# Patient Record
Sex: FEMALE | Race: WHITE | Employment: OTHER | URBAN - METROPOLITAN AREA
[De-identification: names, ages, dates, MRNs, and addresses within clinical notes are randomized per-mention and may not be internally consistent; named-entity substitution may affect disease eponyms.]

---

## 2018-03-21 PROBLEM — 14304000 THYROID DISORDER: Status: ACTIVE | Noted: 2018-03-21

## 2018-03-21 PROBLEM — 38341003 HYPERTENSION: Status: ACTIVE | Noted: 2018-03-21

## 2018-03-21 PROBLEM — 255417007 CIRRHOTIC: Status: ACTIVE | Noted: 2018-03-21

## 2019-08-22 PROBLEM — 170747006: Status: ACTIVE | Noted: 2019-08-22

## 2020-01-07 ENCOUNTER — HOSPITAL ENCOUNTER (EMERGENCY)
Age: 60
Discharge: STILL ADMITTED OR ADMINISTRATIVE | End: 2020-01-07
Attending: EMERGENCY MEDICINE

## 2020-01-07 ENCOUNTER — HOSPITAL ENCOUNTER (OUTPATIENT)
Dept: GENERAL RADIOLOGY | Age: 60
Discharge: HOME | End: 2020-01-07
Payer: COMMERCIAL

## 2020-01-07 DIAGNOSIS — M79.672 LEFT FOOT PAIN: ICD-10-CM

## 2020-01-07 DIAGNOSIS — M79.89 SWELLING OF LEFT FOOT: ICD-10-CM

## 2020-01-07 PROCEDURE — 73630 X-RAY EXAM OF FOOT: CPT

## 2020-09-02 ENCOUNTER — OFFICE VISIT (OUTPATIENT)
Dept: URBAN - METROPOLITAN AREA CLINIC 46 | Facility: CLINIC | Age: 60
End: 2020-09-02

## 2021-08-28 ENCOUNTER — TELEPHONE ENCOUNTER (OUTPATIENT)
Dept: URBAN - METROPOLITAN AREA CLINIC 13 | Facility: CLINIC | Age: 61
End: 2021-08-28

## 2021-08-28 RX ORDER — AZATHIOPRINE 50 MG/1
TABLET ORAL
OUTPATIENT
End: 2020-09-02

## 2021-08-29 ENCOUNTER — TELEPHONE ENCOUNTER (OUTPATIENT)
Dept: URBAN - METROPOLITAN AREA CLINIC 13 | Facility: CLINIC | Age: 61
End: 2021-08-29

## 2021-08-29 RX ORDER — PREDNISONE 5 MG/1
TABLET ORAL
Status: ACTIVE | COMMUNITY

## 2021-08-29 RX ORDER — TRIAMTERENE AND HYDROCHLOROTHIAZIDE 37.5; 25 MG/1; MG/1
CAPSULE ORAL
Status: ACTIVE | COMMUNITY

## 2021-08-29 RX ORDER — LEVOTHYROXINE SODIUM 88 UG/1
TABLET ORAL
Status: ACTIVE | COMMUNITY

## 2021-08-29 RX ORDER — AZATHIOPRINE 50 MG/1
TABLET ORAL
Status: ACTIVE | COMMUNITY

## 2021-08-29 RX ORDER — POTASSIUM CHLORIDE 1500 MG/1
TABLET, EXTENDED RELEASE ORAL
Status: ACTIVE | COMMUNITY

## 2021-09-13 ENCOUNTER — LAB OUTSIDE AN ENCOUNTER (OUTPATIENT)
Dept: URBAN - METROPOLITAN AREA CLINIC 44 | Facility: CLINIC | Age: 61
End: 2021-09-13

## 2021-09-13 ENCOUNTER — OFFICE VISIT (OUTPATIENT)
Dept: URBAN - METROPOLITAN AREA CLINIC 44 | Facility: CLINIC | Age: 61
End: 2021-09-13
Payer: COMMERCIAL

## 2021-09-13 VITALS
HEART RATE: 67 BPM | HEIGHT: 65 IN | TEMPERATURE: 98 F | BODY MASS INDEX: 27.79 KG/M2 | DIASTOLIC BLOOD PRESSURE: 71 MMHG | WEIGHT: 166.8 LBS | SYSTOLIC BLOOD PRESSURE: 123 MMHG

## 2021-09-13 DIAGNOSIS — K75.4 AUTOIMMUNE HEPATITIS TREATED WITH STEROIDS: ICD-10-CM

## 2021-09-13 DIAGNOSIS — K74.60 CIRRHOSIS OF LIVER WITHOUT ASCITES, UNSPECIFIED HEPATIC CIRRHOSIS TYPE: ICD-10-CM

## 2021-09-13 PROCEDURE — 99214 OFFICE O/P EST MOD 30 MIN: CPT | Performed by: INTERNAL MEDICINE

## 2021-09-13 RX ORDER — LOSARTAN POTASSIUM 25 MG/1
1 TABLET TABLET ORAL ONCE A DAY
Status: ACTIVE | COMMUNITY

## 2021-09-13 RX ORDER — ALENDRONATE SODIUM 70 MG/1
1 TABLET 30 MINUTES BEFORE THE FIRST FOOD, BEVERAGE OR MEDICINE OF THE DAY WITH PLAIN WATER TABLET ORAL
Status: ACTIVE | COMMUNITY

## 2021-09-13 RX ORDER — LEVOTHYROXINE SODIUM 88 UG/1
TABLET ORAL
Status: ACTIVE | COMMUNITY

## 2021-09-13 RX ORDER — POTASSIUM CHLORIDE 1500 MG/1
2 TABLET WITH FOOD TABLET, EXTENDED RELEASE ORAL
Status: ACTIVE | COMMUNITY

## 2021-09-13 RX ORDER — INSULIN ASPART 100 [IU]/ML
AS DIRECTED INJECTION, SUSPENSION SUBCUTANEOUS
Status: ACTIVE | COMMUNITY

## 2021-09-13 RX ORDER — TRIAMTERENE AND HYDROCHLOROTHIAZIDE 37.5; 25 MG/1; MG/1
1 TABLET IN THE MORNING TABLET ORAL
Status: ACTIVE | COMMUNITY

## 2021-09-13 RX ORDER — PREDNISONE 5 MG/1
3 TABLETS TABLET ORAL ONCE A DAY
Status: ACTIVE | COMMUNITY

## 2021-09-13 RX ORDER — AZATHIOPRINE 50 MG/1
1 1/2 TABLETS TABLET ORAL DAILY
Status: ACTIVE | COMMUNITY

## 2021-09-13 NOTE — PHYSICAL EXAM NECK/THYROID:
normal appearance , without tenderness upon palpation , no deformities , trachea midline , Thyroid normal size , no thyroid nodules , no masses , no JVD , thyroid nontender Clindamycin Counseling: I counseled the patient regarding use of clindamycin as an antibiotic for prophylactic and/or therapeutic purposes. Clindamycin is active against numerous classes of bacteria, including skin bacteria. Side effects may include nausea, diarrhea, gastrointestinal upset, rash, hives, yeast infections, and in rare cases, colitis.

## 2021-09-13 NOTE — PHYSICAL EXAM HENT:
Head, normocephalic, atraumatic, Face, Face within normal limits, Ears, External ears within normal limits, Nose/Nasopharynx, External nose  normal appearance, nares patent, no nasal discharge, Mouth and Throat, Oral cavity appearance normal, Breath odor normal, Lips, Appearance normal
0 = independent

## 2021-09-13 NOTE — HPI-TODAY'S VISIT:
Toya Anderson is a 60 year old, female, presents for evaluation of AIH. Dx with AIH in 1985 and whose last  MELD of 10[which it has been since 2015] [AST 21, ALT 19, Alk geraldine 76, Alb 3.2, bili 1.2] She has been on AZA and varying low doses of prednisone since her DX. She is on Prednisone 15 mg now for last few years and multiple attempts to lower the dose has met with increase in transaminases. The patient remains asymptomatic and denies flares. Mentions that if medications are decreased she will have a flare. Follows w/ Dr. Martinez(hepatology) every 3 months.  Today, she denies any symptoms of jaundice, fatigue or abdominal pain.

## 2021-09-29 ENCOUNTER — ERX REFILL RESPONSE (OUTPATIENT)
Dept: URBAN - METROPOLITAN AREA CLINIC 44 | Facility: CLINIC | Age: 61
End: 2021-09-29

## 2021-09-29 RX ORDER — AZATHIOPRINE 50 MG/1
1 1/2 TABLETS TABLET ORAL DAILY
OUTPATIENT

## 2021-09-29 RX ORDER — AZATHIOPRINE 50 MG/1
TAKE 1 AND 1/2 TABLET BY MOUTH DAILY TABLET ORAL
Qty: 135 TABLET | Refills: 1 | OUTPATIENT

## 2021-10-14 ENCOUNTER — TELEPHONE ENCOUNTER (OUTPATIENT)
Dept: URBAN - METROPOLITAN AREA CLINIC 46 | Facility: CLINIC | Age: 61
End: 2021-10-14

## 2021-10-21 PROBLEM — 19943007: Status: ACTIVE | Noted: 2021-09-13

## 2021-10-26 ENCOUNTER — WEB ENCOUNTER (OUTPATIENT)
Dept: URBAN - METROPOLITAN AREA CLINIC 44 | Facility: CLINIC | Age: 61
End: 2021-10-26

## 2021-10-27 ENCOUNTER — WEB ENCOUNTER (OUTPATIENT)
Dept: URBAN - METROPOLITAN AREA CLINIC 44 | Facility: CLINIC | Age: 61
End: 2021-10-27

## 2021-11-13 ENCOUNTER — LAB OUTSIDE AN ENCOUNTER (OUTPATIENT)
Dept: URBAN - METROPOLITAN AREA CLINIC 44 | Facility: CLINIC | Age: 61
End: 2021-11-13

## 2021-11-16 ENCOUNTER — TELEPHONE ENCOUNTER (OUTPATIENT)
Dept: URBAN - METROPOLITAN AREA CLINIC 46 | Facility: CLINIC | Age: 61
End: 2021-11-16

## 2021-11-22 ENCOUNTER — OFFICE VISIT (OUTPATIENT)
Dept: URBAN - METROPOLITAN AREA TELEHEALTH 2 | Facility: TELEHEALTH | Age: 61
End: 2021-11-22
Payer: COMMERCIAL

## 2021-11-22 VITALS — BODY MASS INDEX: 27.49 KG/M2 | HEIGHT: 65 IN | WEIGHT: 165 LBS

## 2021-11-22 DIAGNOSIS — K75.4 AUTOIMMUNE HEPATITIS TREATED WITH STEROIDS: ICD-10-CM

## 2021-11-22 PROCEDURE — 99212 OFFICE O/P EST SF 10 MIN: CPT | Performed by: INTERNAL MEDICINE

## 2021-11-22 RX ORDER — PREDNISONE 5 MG/1
3 TABLETS TABLET ORAL ONCE A DAY
Status: ACTIVE | COMMUNITY

## 2021-11-22 RX ORDER — TRIAMTERENE AND HYDROCHLOROTHIAZIDE 37.5; 25 MG/1; MG/1
1 TABLET IN THE MORNING TABLET ORAL
Status: ACTIVE | COMMUNITY

## 2021-11-22 RX ORDER — LOSARTAN POTASSIUM 25 MG/1
1 TABLET TABLET ORAL ONCE A DAY
Status: ACTIVE | COMMUNITY

## 2021-11-22 RX ORDER — INSULIN ASPART 100 [IU]/ML
AS DIRECTED INJECTION, SUSPENSION SUBCUTANEOUS
Status: ACTIVE | COMMUNITY

## 2021-11-22 RX ORDER — POTASSIUM CHLORIDE 1500 MG/1
2 TABLET WITH FOOD TABLET, EXTENDED RELEASE ORAL
Status: ACTIVE | COMMUNITY

## 2021-11-22 RX ORDER — AZATHIOPRINE 50 MG/1
TAKE 1 AND 1/2 TABLET BY MOUTH DAILY TABLET ORAL
Qty: 135 TABLET | Refills: 1 | Status: ACTIVE | COMMUNITY

## 2021-11-22 RX ORDER — DULAGLUTIDE 0.75 MG/.5ML
AS DIRECTED INJECTION, SOLUTION SUBCUTANEOUS
Status: ACTIVE | COMMUNITY

## 2021-11-22 RX ORDER — LEVOTHYROXINE SODIUM 88 UG/1
TABLET ORAL
Status: ACTIVE | COMMUNITY

## 2021-11-22 RX ORDER — ALENDRONATE SODIUM 70 MG/1
1 TABLET 30 MINUTES BEFORE THE FIRST FOOD, BEVERAGE OR MEDICINE OF THE DAY WITH PLAIN WATER TABLET ORAL
Status: ACTIVE | COMMUNITY

## 2021-11-22 NOTE — HPI-TODAY'S VISIT:
Toya Anderson is a 60 year old, female, presents for evaluation of AIH. Dx with AIH in  and whose last  MELD of 10[which it has been since 2015] [AST 21, ALT 19, Alk geraldine 76, Alb 3.2, bili 1.2]. Recent labs suggest T bilirubin of 1.5 which is mildly elevated but likely driven by indirect forces and not the liver. LFTs were normal(2021). Denies  pruritus, jaundice, fatigue or changes in mentation.   She has been on AZA and varying low doses of prednisone since her DX. She is on Prednisone 15 mg now for last few years and multiple attempts to lower the dose has met with increase in transaminases.  Mentions that if medications are decreased she will have a flare. She is up to date with her DEXA scans. Follows w/ Dr. Martinez(hepatology) every 3 months.    Patient seen today via telehealth by agreement and consent of patient in light of current COVID-19 pandemic. I used a telephone call during the visit. The patient encounter is appropriate and reasonable under the circumstances given the patient's particular presentation at this time. The patient has been advised of the followin) the potential risks and limitations of this mode of treatment (including but not limited to the absence of in-person examination); 2) the right to refuse telehealth services at any point without affecting the right to future care; 3) the right to receive in-person services, included immediately after this consultation if an urgent need arises; 4) information, including identifiable images or information from this telehealth consult, will only be shared in accordance with HIPPA regulations. Any and all of the patient's and/or patient's family member's questions on this issue have been answered. The patient has verbally consented to be treated via telehealth services. The patient has also been advised to contact this office for worsening conditions or problems, and seek emergency medical treatment and/or call 911 if the patient deems either necessary.

## 2021-11-23 PROBLEM — 408335007: Status: ACTIVE | Noted: 2021-09-13

## 2021-12-06 ENCOUNTER — NEW PATIENT (OUTPATIENT)
Dept: URBAN - METROPOLITAN AREA CLINIC 34 | Facility: CLINIC | Age: 61
End: 2021-12-06

## 2021-12-06 DIAGNOSIS — H25.13: ICD-10-CM

## 2021-12-06 DIAGNOSIS — H04.123: ICD-10-CM

## 2021-12-06 DIAGNOSIS — H52.4: ICD-10-CM

## 2021-12-06 DIAGNOSIS — H43.393: ICD-10-CM

## 2021-12-06 PROCEDURE — 99203 OFFICE O/P NEW LOW 30 MIN: CPT

## 2021-12-06 ASSESSMENT — TONOMETRY
OS_IOP_MMHG: 15
OD_IOP_MMHG: 15

## 2021-12-06 ASSESSMENT — VISUAL ACUITY
OS_SC: 20/20
OD_SC: 20/20
OU_CC: J1

## 2021-12-30 ENCOUNTER — ERX REFILL RESPONSE (OUTPATIENT)
Dept: URBAN - METROPOLITAN AREA CLINIC 44 | Facility: CLINIC | Age: 61
End: 2021-12-30

## 2021-12-30 RX ORDER — AZATHIOPRINE 50 MG/1
TAKE 1 AND 1/2 TABLET BY MOUTH DAILY TABLET ORAL
Qty: 135 TABLET | Refills: 1 | OUTPATIENT

## 2022-03-28 ENCOUNTER — ERX REFILL RESPONSE (OUTPATIENT)
Dept: URBAN - METROPOLITAN AREA CLINIC 44 | Facility: CLINIC | Age: 62
End: 2022-03-28

## 2022-03-28 RX ORDER — AZATHIOPRINE 50 MG/1
TAKE 1 AND 1/2 TABLET BY MOUTH DAILY TABLET ORAL
Qty: 135 TABLET | Refills: 1 | OUTPATIENT

## 2022-03-28 RX ORDER — AZATHIOPRINE 50 MG/1
TAKE 1 AND 1/2 TABLETS BY MOUTH DAILY TABLET ORAL
Qty: 135 TABLET | Refills: 1 | OUTPATIENT

## 2022-05-04 ENCOUNTER — ERX REFILL RESPONSE (OUTPATIENT)
Dept: URBAN - METROPOLITAN AREA CLINIC 44 | Facility: CLINIC | Age: 62
End: 2022-05-04

## 2022-05-04 RX ORDER — PREDNISONE 5 MG/1
TAKE 1 TABLET BY MOUTH THREE TIMES A DAY TABLET ORAL
Qty: 270 TABLET | Refills: 3 | OUTPATIENT

## 2022-06-27 ENCOUNTER — ERX REFILL RESPONSE (OUTPATIENT)
Dept: URBAN - METROPOLITAN AREA CLINIC 44 | Facility: CLINIC | Age: 62
End: 2022-06-27

## 2022-06-27 RX ORDER — AZATHIOPRINE 50 MG/1
TAKE 1 AND 1/2 TABLETS BY MOUTH DAILY TABLET ORAL
Qty: 135 TABLET | Refills: 1 | OUTPATIENT

## 2022-06-27 RX ORDER — AZATHIOPRINE 50 MG/1
TAKE 1 AND 1/2 TABLET BY MOUTH DAILY FOR 90 DAYS TABLET ORAL
Qty: 135 TABLET | Refills: 0 | OUTPATIENT

## 2022-09-28 ENCOUNTER — ERX REFILL RESPONSE (OUTPATIENT)
Dept: URBAN - METROPOLITAN AREA CLINIC 44 | Facility: CLINIC | Age: 62
End: 2022-09-28

## 2022-09-28 RX ORDER — AZATHIOPRINE 50 MG/1
TAKE 1 AND 1/2 TABLET BY MOUTH DAILY FOR 90 DAYS TABLET ORAL
Qty: 135 TABLET | Refills: 0 | OUTPATIENT

## 2022-12-20 ENCOUNTER — ERX REFILL RESPONSE (OUTPATIENT)
Dept: URBAN - METROPOLITAN AREA CLINIC 44 | Facility: CLINIC | Age: 62
End: 2022-12-20

## 2022-12-20 RX ORDER — AZATHIOPRINE 50 MG/1
TAKE 1 AND 1/2 TABLET BY MOUTH DAILY FOR 90 DAYS TABLET ORAL
Qty: 135 TABLET | Refills: 0 | OUTPATIENT

## 2023-02-09 ENCOUNTER — ERX REFILL RESPONSE (OUTPATIENT)
Dept: URBAN - METROPOLITAN AREA CLINIC 44 | Facility: CLINIC | Age: 63
End: 2023-02-09

## 2023-02-09 RX ORDER — PREDNISONE 5 MG/1
TAKE 1 TABLET BY MOUTH THREE TIMES A DAY TABLET ORAL
Qty: 270 TABLET | Refills: 3 | OUTPATIENT

## 2023-02-09 RX ORDER — PREDNISONE 5 MG/1
TAKE 1 TABLET BY MOUTH THREE TIMES A DAY TABLET ORAL
Qty: 270 TABLET | Refills: 2 | OUTPATIENT

## 2023-03-16 ENCOUNTER — ERX REFILL RESPONSE (OUTPATIENT)
Dept: URBAN - METROPOLITAN AREA CLINIC 44 | Facility: CLINIC | Age: 63
End: 2023-03-16

## 2023-03-16 RX ORDER — AZATHIOPRINE 50 MG/1
TAKE 1 AND 1/2 TABLET BY MOUTH DAILY FOR 90 DAYS TABLET ORAL
Qty: 135 TABLET | Refills: 0 | OUTPATIENT

## 2023-06-13 ENCOUNTER — TELEPHONE ENCOUNTER (OUTPATIENT)
Dept: URBAN - METROPOLITAN AREA CLINIC 44 | Facility: CLINIC | Age: 63
End: 2023-06-13

## 2023-06-13 RX ORDER — AZATHIOPRINE 50 MG/1
TAKE 1 1/2 TABLET TABLET ORAL ONCE A DAY
Qty: 45 TABLET | Refills: 0

## 2023-06-20 ENCOUNTER — LAB OUTSIDE AN ENCOUNTER (OUTPATIENT)
Dept: URBAN - METROPOLITAN AREA CLINIC 44 | Facility: CLINIC | Age: 63
End: 2023-06-20

## 2023-06-20 ENCOUNTER — OFFICE VISIT (OUTPATIENT)
Dept: URBAN - METROPOLITAN AREA CLINIC 44 | Facility: CLINIC | Age: 63
End: 2023-06-20
Payer: COMMERCIAL

## 2023-06-20 VITALS
TEMPERATURE: 97.5 F | HEIGHT: 65 IN | DIASTOLIC BLOOD PRESSURE: 72 MMHG | BODY MASS INDEX: 26.12 KG/M2 | HEART RATE: 62 BPM | SYSTOLIC BLOOD PRESSURE: 135 MMHG | WEIGHT: 156.8 LBS

## 2023-06-20 DIAGNOSIS — K75.4 AUTOIMMUNE HEPATITIS TREATED WITH STEROIDS: ICD-10-CM

## 2023-06-20 PROCEDURE — 99214 OFFICE O/P EST MOD 30 MIN: CPT | Performed by: INTERNAL MEDICINE

## 2023-06-20 RX ORDER — LOSARTAN POTASSIUM 25 MG/1
1 TABLET TABLET ORAL ONCE A DAY
Status: ACTIVE | COMMUNITY

## 2023-06-20 RX ORDER — INSULIN DEGLUDEC INJECTION 100 U/ML
INJECTION, SOLUTION SUBCUTANEOUS
Qty: 30 MILLILITER | Status: ACTIVE | COMMUNITY

## 2023-06-20 RX ORDER — AZATHIOPRINE 50 MG/1
TAKE 1 1/2 TABLET TABLET ORAL ONCE A DAY
Qty: 45 TABLET | Refills: 0 | Status: ACTIVE | COMMUNITY

## 2023-06-20 RX ORDER — SPIRONOLACTONE 50 MG/1
TABLET ORAL
Qty: 90 TABLET | Status: ACTIVE | COMMUNITY

## 2023-06-20 RX ORDER — INSULIN ASPART 100 [IU]/ML
PLEASE SEE ATTACHED FOR DETAILED DIRECTIONS INJECTION, SOLUTION INTRAVENOUS; SUBCUTANEOUS
Qty: 15 MILLILITER | Refills: 0 | Status: ACTIVE | COMMUNITY

## 2023-06-20 RX ORDER — PREDNISONE 5 MG/1
TAKE 1 TABLET BY MOUTH THREE TIMES A DAY TABLET ORAL
Qty: 270 TABLET | Refills: 2 | Status: ACTIVE | COMMUNITY

## 2023-06-20 RX ORDER — POTASSIUM CHLORIDE 1500 MG/1
2 TABLET WITH FOOD TABLET, EXTENDED RELEASE ORAL
Status: ACTIVE | COMMUNITY

## 2023-06-20 RX ORDER — AMLODIPINE BESYLATE 5 MG/1
TABLET ORAL
Qty: 90 TABLET | Status: ACTIVE | COMMUNITY

## 2023-06-20 RX ORDER — LEVOTHYROXINE SODIUM 88 UG/1
TABLET ORAL
Status: ACTIVE | COMMUNITY

## 2023-06-20 RX ORDER — ALENDRONATE SODIUM 70 MG/1
1 TABLET 30 MINUTES BEFORE THE FIRST FOOD, BEVERAGE OR MEDICINE OF THE DAY WITH PLAIN WATER TABLET ORAL
Status: ACTIVE | COMMUNITY

## 2023-06-20 NOTE — HPI-TODAY'S VISIT:
Toya Anderson is a 60 year old, female, presents for evaluation of AIH. Dx with AIH in 1985 and whose last  MELD of 10[which it has been since 2015] [AST 21, ALT 19, Alk geraldine 76, Alb 3.2, bili 1.2]. Recent labs suggest T bilirubin of 1.5 which is mildly elevated but likely driven by indirect forces and not the liver. LFTs were normal(9/2021). Denies  pruritus, jaundice, fatigue or changes in mentation.   She has been on AZA and varying low doses of prednisone since her DX. She is on Prednisone 15 mg now for last few years and multiple attempts to lower the dose has met with increase in transaminases.  Mentions that if medications are decreased she will have a flare. She is up to date with her DEXA scans. The patient was under the impression that she had a hepatologist but she instead has been following with an endocrinologist.  Therefore, we will need to ensure the patient has work up for AIH w/ cirrhosis.

## 2023-06-29 ENCOUNTER — OUT OF OFFICE VISIT (OUTPATIENT)
Dept: URBAN - METROPOLITAN AREA SURGERY CENTER 27 | Facility: SURGERY CENTER | Age: 63
End: 2023-06-29

## 2023-06-29 ENCOUNTER — OFFICE VISIT (OUTPATIENT)
Dept: URBAN - METROPOLITAN AREA SURGERY CENTER 27 | Facility: SURGERY CENTER | Age: 63
End: 2023-06-29
Payer: COMMERCIAL

## 2023-06-29 ENCOUNTER — CLAIMS CREATED FROM THE CLAIM WINDOW (OUTPATIENT)
Dept: URBAN - METROPOLITAN AREA CLINIC 4 | Facility: CLINIC | Age: 63
End: 2023-06-29
Payer: COMMERCIAL

## 2023-06-29 DIAGNOSIS — K74.60 ADVANCED CIRRHOSIS: ICD-10-CM

## 2023-06-29 DIAGNOSIS — K31.89 OTHER DISEASES OF STOMACH AND DUODENUM: ICD-10-CM

## 2023-06-29 DIAGNOSIS — K31.89 ACQUIRED DEFORMITY OF DUODENUM: ICD-10-CM

## 2023-06-29 DIAGNOSIS — B37.81 CANDIDA ESOPHAGITIS: ICD-10-CM

## 2023-06-29 DIAGNOSIS — B37.81 CANDIDA: ICD-10-CM

## 2023-06-29 PROCEDURE — 88312 SPECIAL STAINS GROUP 1: CPT | Performed by: PATHOLOGY

## 2023-06-29 PROCEDURE — G8907 PT DOC NO EVENTS ON DISCHARG: HCPCS | Performed by: INTERNAL MEDICINE

## 2023-06-29 PROCEDURE — 88305 TISSUE EXAM BY PATHOLOGIST: CPT | Performed by: PATHOLOGY

## 2023-06-29 PROCEDURE — 00731 ANES UPR GI NDSC PX NOS: CPT | Performed by: NURSE ANESTHETIST, CERTIFIED REGISTERED

## 2023-06-29 PROCEDURE — 43239 EGD BIOPSY SINGLE/MULTIPLE: CPT | Performed by: INTERNAL MEDICINE

## 2023-06-29 RX ORDER — LEVOTHYROXINE SODIUM 88 UG/1
TABLET ORAL
Status: ACTIVE | COMMUNITY

## 2023-06-29 RX ORDER — AZATHIOPRINE 50 MG/1
TAKE 1 1/2 TABLET TABLET ORAL ONCE A DAY
Qty: 45 TABLET | Refills: 0 | Status: ACTIVE | COMMUNITY

## 2023-06-29 RX ORDER — LOSARTAN POTASSIUM 25 MG/1
1 TABLET TABLET ORAL ONCE A DAY
Status: ACTIVE | COMMUNITY

## 2023-06-29 RX ORDER — POTASSIUM CHLORIDE 1500 MG/1
2 TABLET WITH FOOD TABLET, EXTENDED RELEASE ORAL
Status: ACTIVE | COMMUNITY

## 2023-06-29 RX ORDER — SPIRONOLACTONE 50 MG/1
TABLET ORAL
Qty: 90 TABLET | Status: ACTIVE | COMMUNITY

## 2023-06-29 RX ORDER — PREDNISONE 5 MG/1
TAKE 1 TABLET BY MOUTH THREE TIMES A DAY TABLET ORAL
Qty: 270 TABLET | Refills: 2 | Status: ACTIVE | COMMUNITY

## 2023-06-29 RX ORDER — ALENDRONATE SODIUM 70 MG/1
1 TABLET 30 MINUTES BEFORE THE FIRST FOOD, BEVERAGE OR MEDICINE OF THE DAY WITH PLAIN WATER TABLET ORAL
Status: ACTIVE | COMMUNITY

## 2023-06-29 RX ORDER — INSULIN DEGLUDEC INJECTION 100 U/ML
INJECTION, SOLUTION SUBCUTANEOUS
Qty: 30 MILLILITER | Status: ACTIVE | COMMUNITY

## 2023-06-29 RX ORDER — INSULIN ASPART 100 [IU]/ML
PLEASE SEE ATTACHED FOR DETAILED DIRECTIONS INJECTION, SOLUTION INTRAVENOUS; SUBCUTANEOUS
Qty: 15 MILLILITER | Refills: 0 | Status: ACTIVE | COMMUNITY

## 2023-06-29 RX ORDER — AMLODIPINE BESYLATE 5 MG/1
TABLET ORAL
Qty: 90 TABLET | Status: ACTIVE | COMMUNITY

## 2023-07-20 ENCOUNTER — TELEPHONE ENCOUNTER (OUTPATIENT)
Dept: URBAN - METROPOLITAN AREA CLINIC 44 | Facility: CLINIC | Age: 63
End: 2023-07-20

## 2023-07-20 ENCOUNTER — LAB OUTSIDE AN ENCOUNTER (OUTPATIENT)
Dept: URBAN - METROPOLITAN AREA CLINIC 44 | Facility: CLINIC | Age: 63
End: 2023-07-20

## 2023-07-27 ENCOUNTER — TELEPHONE ENCOUNTER (OUTPATIENT)
Dept: URBAN - METROPOLITAN AREA CLINIC 46 | Facility: CLINIC | Age: 63
End: 2023-07-27

## 2023-08-15 ENCOUNTER — OFFICE VISIT (OUTPATIENT)
Dept: URBAN - METROPOLITAN AREA CLINIC 44 | Facility: CLINIC | Age: 63
End: 2023-08-15

## 2023-09-11 ENCOUNTER — TELEPHONE ENCOUNTER (OUTPATIENT)
Dept: URBAN - METROPOLITAN AREA CLINIC 44 | Facility: CLINIC | Age: 63
End: 2023-09-11

## 2023-09-11 RX ORDER — AZATHIOPRINE 50 MG/1
TAKE 1 1/2 TABLET TABLET ORAL ONCE A DAY
Qty: 90 | Refills: 0

## 2023-09-12 ENCOUNTER — ERX REFILL RESPONSE (OUTPATIENT)
Dept: URBAN - METROPOLITAN AREA CLINIC 44 | Facility: CLINIC | Age: 63
End: 2023-09-12

## 2023-09-12 RX ORDER — AZATHIOPRINE 50 MG/1
TAKE 1 1/2 TABLET TABLET ORAL ONCE A DAY
Qty: 90 | Refills: 0 | OUTPATIENT

## 2023-09-25 ENCOUNTER — WEB ENCOUNTER (OUTPATIENT)
Dept: URBAN - METROPOLITAN AREA CLINIC 48 | Facility: CLINIC | Age: 63
End: 2023-09-25

## 2023-09-26 ENCOUNTER — OFFICE VISIT (OUTPATIENT)
Dept: URBAN - METROPOLITAN AREA CLINIC 44 | Facility: CLINIC | Age: 63
End: 2023-09-26
Payer: COMMERCIAL

## 2023-09-26 ENCOUNTER — LAB OUTSIDE AN ENCOUNTER (OUTPATIENT)
Dept: URBAN - METROPOLITAN AREA CLINIC 44 | Facility: CLINIC | Age: 63
End: 2023-09-26

## 2023-09-26 ENCOUNTER — OFFICE VISIT (OUTPATIENT)
Dept: URBAN - METROPOLITAN AREA CLINIC 48 | Facility: CLINIC | Age: 63
End: 2023-09-26

## 2023-09-26 VITALS
HEART RATE: 86 BPM | TEMPERATURE: 97.6 F | HEIGHT: 65 IN | SYSTOLIC BLOOD PRESSURE: 140 MMHG | BODY MASS INDEX: 29.39 KG/M2 | DIASTOLIC BLOOD PRESSURE: 63 MMHG | WEIGHT: 176.4 LBS

## 2023-09-26 DIAGNOSIS — B37.81 CANDIDA ESOPHAGITIS: ICD-10-CM

## 2023-09-26 DIAGNOSIS — K74.60 CIRRHOSIS OF LIVER WITHOUT ASCITES, UNSPECIFIED HEPATIC CIRRHOSIS TYPE: ICD-10-CM

## 2023-09-26 PROCEDURE — 99213 OFFICE O/P EST LOW 20 MIN: CPT | Performed by: INTERNAL MEDICINE

## 2023-09-26 RX ORDER — LEVOTHYROXINE SODIUM 88 UG/1
1 TABLET IN THE MORNING ON AN EMPTY STOMACH TABLET ORAL ONCE A DAY
Status: ACTIVE | COMMUNITY

## 2023-09-26 RX ORDER — SPIRONOLACTONE 50 MG/1
1 TABLET TABLET ORAL ONCE A DAY
Qty: 90 TABLET | Status: ACTIVE | COMMUNITY

## 2023-09-26 RX ORDER — ALENDRONATE SODIUM 70 MG/1
1 TABLET 30 MINUTES BEFORE THE FIRST FOOD, BEVERAGE OR MEDICINE OF THE DAY WITH PLAIN WATER TABLET ORAL
Status: ACTIVE | COMMUNITY

## 2023-09-26 RX ORDER — PREDNISONE 5 MG/1
1 TABLET TABLET ORAL THREE TIMES A DAY
Qty: 270 TABLET | Refills: 2 | Status: ACTIVE | COMMUNITY

## 2023-09-26 RX ORDER — FLUCONAZOLE 100 MG/1
1 TABLET TABLET ORAL DAILY
Qty: 30 TABLET | Refills: 0 | OUTPATIENT
Start: 2023-09-26 | End: 2023-10-06

## 2023-09-26 RX ORDER — POTASSIUM CHLORIDE 1500 MG/1
2 TABLET WITH FOOD TABLET, EXTENDED RELEASE ORAL ONCE A DAY
Status: ACTIVE | COMMUNITY

## 2023-09-26 RX ORDER — LOSARTAN POTASSIUM 25 MG/1
1 TABLET TABLET ORAL ONCE A DAY
Status: ACTIVE | COMMUNITY

## 2023-09-26 RX ORDER — AZATHIOPRINE 50 MG/1
TAKE 1 1/2 TABLET TABLET ORAL ONCE A DAY
Qty: 90 | Refills: 0 | Status: ACTIVE | COMMUNITY

## 2023-09-26 RX ORDER — INSULIN ASPART 100 [IU]/ML
AS DIRECTED INJECTION, SOLUTION INTRAVENOUS; SUBCUTANEOUS
Refills: 0 | Status: ACTIVE | COMMUNITY

## 2023-09-26 RX ORDER — INSULIN DEGLUDEC INJECTION 100 U/ML
AS DIRECTED INJECTION, SOLUTION SUBCUTANEOUS
Status: ACTIVE | COMMUNITY

## 2023-09-26 NOTE — HPI-TODAY'S VISIT:
Toya Anderson is a 60 year old, female, presents for evaluation of AIH. Dx with AIH in 1985 and whose last  MELD of 10[which it has been since 2015] [AST 21, ALT 19, Alk geraldine 76, Alb 3.2, bili 1.2]. Recent labs suggest T bilirubin of 1.5 which is mildly elevated but likely driven by indirect forces and not the liver. LFTs were normal(9/2021). Denies  pruritus, jaundice, fatigue or changes in mentation.   She has been on AZA and varying low doses of prednisone since her DX. She is on Prednisone 15 mg now for last few years and multiple attempts to lower the dose has met with increase in transaminases.  Mentions that if medications are decreased she will have a flare. She is up to date with her DEXA scans. The patient was under the impression that she had a hepatologist but she instead has been following with an endocrinologist.  Therefore, we will need to ensure the patient has work up for AIH w/ cirrhosis. EGD on 6/29/2023 revealed no varices but she had esophageal candida.  CT scan on 6/23 revealed a liver lesion and renal lesion. 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  Her most recent labs suggest a MELD -NA score of 11.

## 2023-10-06 ENCOUNTER — TELEPHONE ENCOUNTER (OUTPATIENT)
Dept: URBAN - METROPOLITAN AREA CLINIC 44 | Facility: CLINIC | Age: 63
End: 2023-10-06

## 2023-10-06 RX ORDER — AZATHIOPRINE 50 MG/1
TAKE 1 1/2 TABLET TABLET ORAL ONCE A DAY
Qty: 90 | Refills: 0
End: 2024-01-04

## 2023-10-10 ENCOUNTER — TELEPHONE ENCOUNTER (OUTPATIENT)
Dept: URBAN - METROPOLITAN AREA CLINIC 44 | Facility: CLINIC | Age: 63
End: 2023-10-10

## 2023-10-10 ENCOUNTER — ERX REFILL RESPONSE (OUTPATIENT)
Dept: URBAN - METROPOLITAN AREA CLINIC 44 | Facility: CLINIC | Age: 63
End: 2023-10-10

## 2023-10-10 RX ORDER — AZATHIOPRINE 50 MG/1
TAKE 1 1/2 TABLET ORALLY ONCE A DAY 90 DAYS TABLET ORAL
Qty: 135 TABLET | Refills: 1 | OUTPATIENT

## 2023-11-07 ENCOUNTER — TELEPHONE ENCOUNTER (OUTPATIENT)
Dept: URBAN - METROPOLITAN AREA CLINIC 44 | Facility: CLINIC | Age: 63
End: 2023-11-07

## 2023-12-18 ENCOUNTER — TELEPHONE ENCOUNTER (OUTPATIENT)
Dept: URBAN - METROPOLITAN AREA CLINIC 46 | Facility: CLINIC | Age: 63
End: 2023-12-18

## 2023-12-18 RX ORDER — PREDNISONE 5 MG/1
1 TABLET TABLET ORAL THREE TIMES A DAY
Qty: 270 TABLET | Refills: 2
End: 2024-09-14

## 2023-12-19 ENCOUNTER — ERX REFILL RESPONSE (OUTPATIENT)
Dept: URBAN - METROPOLITAN AREA CLINIC 44 | Facility: CLINIC | Age: 63
End: 2023-12-19

## 2023-12-19 RX ORDER — PREDNISONE 5 MG/1
1 TABLET TABLET ORAL THREE TIMES A DAY
Qty: 270 TABLET | Refills: 2 | OUTPATIENT

## 2023-12-27 ENCOUNTER — OFFICE VISIT (OUTPATIENT)
Dept: URBAN - METROPOLITAN AREA CLINIC 44 | Facility: CLINIC | Age: 63
End: 2023-12-27

## 2024-01-03 ENCOUNTER — COMPLETE EYE EXAM (OUTPATIENT)
Dept: URBAN - METROPOLITAN AREA CLINIC 34 | Facility: CLINIC | Age: 64
End: 2024-01-03

## 2024-01-03 DIAGNOSIS — H25.13: ICD-10-CM

## 2024-01-03 DIAGNOSIS — H52.4: ICD-10-CM

## 2024-01-03 DIAGNOSIS — H43.393: ICD-10-CM

## 2024-01-03 DIAGNOSIS — H04.123: ICD-10-CM

## 2024-01-03 PROCEDURE — 99214 OFFICE O/P EST MOD 30 MIN: CPT

## 2024-01-03 PROCEDURE — 92015 DETERMINE REFRACTIVE STATE: CPT | Mod: NC

## 2024-01-03 ASSESSMENT — TONOMETRY
OD_IOP_MMHG: 24
OS_IOP_MMHG: 24

## 2024-01-03 ASSESSMENT — VISUAL ACUITY
OS_SC: 20/25-2
OD_SC: 20/20-2

## 2024-01-24 ENCOUNTER — DASHBOARD ENCOUNTERS (OUTPATIENT)
Age: 64
End: 2024-01-24

## 2024-01-24 ENCOUNTER — OFFICE VISIT (OUTPATIENT)
Dept: URBAN - METROPOLITAN AREA TELEHEALTH 2 | Facility: TELEHEALTH | Age: 64
End: 2024-01-24
Payer: COMMERCIAL

## 2024-01-24 VITALS — HEIGHT: 65 IN | WEIGHT: 171 LBS | BODY MASS INDEX: 28.49 KG/M2

## 2024-01-24 DIAGNOSIS — K74.60 CIRRHOSIS OF LIVER WITHOUT ASCITES: ICD-10-CM

## 2024-01-24 PROCEDURE — 99442 PHONE E/M BY PHYS 11-20 MIN: CPT | Performed by: INTERNAL MEDICINE

## 2024-01-24 RX ORDER — PREDNISONE 5 MG/1
1 TABLET TABLET ORAL THREE TIMES A DAY
Qty: 270 TABLET | Refills: 2 | Status: ACTIVE | COMMUNITY

## 2024-01-24 RX ORDER — SEMAGLUTIDE 0.68 MG/ML
AS DIRECTED INJECTION, SOLUTION SUBCUTANEOUS
Status: ACTIVE | COMMUNITY

## 2024-01-24 RX ORDER — ALENDRONATE SODIUM 70 MG/1
1 TABLET 30 MINUTES BEFORE THE FIRST FOOD, BEVERAGE OR MEDICINE OF THE DAY WITH PLAIN WATER TABLET ORAL
Status: ACTIVE | COMMUNITY

## 2024-01-24 RX ORDER — LOSARTAN POTASSIUM 25 MG/1
1 TABLET TABLET ORAL ONCE A DAY
Status: ACTIVE | COMMUNITY

## 2024-01-24 RX ORDER — INSULIN ASPART 100 [IU]/ML
AS DIRECTED PER SLIDING SCALE INJECTION, SOLUTION INTRAVENOUS; SUBCUTANEOUS
Refills: 0 | Status: ACTIVE | COMMUNITY

## 2024-01-24 RX ORDER — SPIRONOLACTONE 50 MG/1
1 TABLET TABLET ORAL ONCE A DAY
Qty: 90 TABLET | Status: ACTIVE | COMMUNITY

## 2024-01-24 RX ORDER — POTASSIUM CHLORIDE 1500 MG/1
2 TABLET WITH FOOD TABLET, EXTENDED RELEASE ORAL ONCE A DAY
Status: ACTIVE | COMMUNITY

## 2024-01-24 RX ORDER — INSULIN DEGLUDEC INJECTION 100 U/ML
AS DIRECTED INJECTION, SOLUTION SUBCUTANEOUS
Status: ACTIVE | COMMUNITY

## 2024-01-24 RX ORDER — AZATHIOPRINE 50 MG/1
TAKE 1 1/2 TABLET ORALLY ONCE A DAY 90 DAYS TABLET ORAL
Qty: 135 TABLET | Refills: 1 | Status: ACTIVE | COMMUNITY

## 2024-01-24 RX ORDER — LEVOTHYROXINE SODIUM 88 UG/1
1 TABLET IN THE MORNING ON AN EMPTY STOMACH TABLET ORAL ONCE A DAY
Status: ACTIVE | COMMUNITY

## 2024-01-24 NOTE — HPI-TODAY'S VISIT:
Toya Anderson is a 63 year old, female, presents for evaluation of AIH as a telehealth visit.  She was diagnosed with AIH in  and MELD  scores have been usually ~ 10- 11. Her most recent labs in 10/2023 revealed T bilirubin of 1.5 which is mildly elevated but likely driven by indirect forces and not the liver. LFTs were normal and Alk phos mildly elevated to 152.  Denies  pruritus, jaundice, fatigue or changes in mentation.   She has been on AZA and varying low doses of prednisone since her DX. She is on Prednisone 15 mg now for last few years and multiple attempts to lower the dose has met with increase in transaminases.  Mentions that if medications are decreased she will have a flare. She is up to date with her DEXA scans baed on her recollection(we do not have a copy of her last one but it was done Jan). Her PCP has been following this and has prescribed Alendronate.  Of note, she is now on Ozempic with weight loss of 9 lbs since starting one month.   EGD on 2023 revealed no varices but she had esophageal candida.  CT scan on  revealed a liver lesion and renal lesion.  Repeat MRI on 2023 suggested a simple biliary hamartoma or simple cyst and simple cyst of kidney. Patient seen today via telehealth by agreement and consent of patient in light of current COVID-19 pandemic. I used video conferencing during the visit. The patient encounter is appropriate and reasonable under the circumstances given the patient's particular presentation at this time. The patient has been advised of the followin) the potential risks and limitations of this mode of treatment (including but not limited to the absence of in-person examination); 2) the right to refuse telehealth services at any point without affecting the right to future care; 3) the right to receive in-person services, included immediately after this consultation if an urgent need arises; 4) information, including identifiable images or information from this telehealth consult, will only be shared in accordance with HIPPA regulations. Any and all of the patient's and/or patient's family member's questions on this issue have been answered. The patient has verbally consented to be treated via telehealth services. The patient has also been advised to contact this office for worsening conditions or problems, and seek emergency medical treatment and/or call 911 if the patient deems either necessary.   More than half of the face-to-face time used for counseling and coordination of care.  The patient received telehealth services at: home 17 minutes

## 2024-03-18 ENCOUNTER — OV EP (OUTPATIENT)
Dept: URBAN - METROPOLITAN AREA CLINIC 48 | Facility: CLINIC | Age: 64
End: 2024-03-18

## 2024-07-11 ENCOUNTER — OFFICE VISIT (OUTPATIENT)
Dept: URBAN - METROPOLITAN AREA TELEHEALTH 2 | Facility: TELEHEALTH | Age: 64
End: 2024-07-11
Payer: COMMERCIAL

## 2024-07-11 VITALS — BODY MASS INDEX: 29.32 KG/M2 | HEIGHT: 65 IN | WEIGHT: 176 LBS

## 2024-07-11 DIAGNOSIS — K74.69 OTHER CIRRHOSIS OF LIVER: ICD-10-CM

## 2024-07-11 DIAGNOSIS — K75.4 AUTOIMMUNE HEPATITIS TREATED WITH STEROIDS: ICD-10-CM

## 2024-07-11 PROCEDURE — 99213 OFFICE O/P EST LOW 20 MIN: CPT | Performed by: INTERNAL MEDICINE

## 2024-07-11 RX ORDER — PREDNISONE 5 MG/1
1 TABLET TABLET ORAL THREE TIMES A DAY
Qty: 270 TABLET | Refills: 2 | Status: ACTIVE | COMMUNITY

## 2024-07-11 RX ORDER — AZATHIOPRINE 50 MG/1
TAKE 1 1/2 TABLET ORALLY ONCE A DAY 90 DAYS TABLET ORAL
OUTPATIENT

## 2024-07-11 RX ORDER — LEVOTHYROXINE SODIUM 75 UG/1
1 TABLET IN THE MORNING ON AN EMPTY STOMACH TABLET ORAL ONCE A DAY
Status: ACTIVE | COMMUNITY

## 2024-07-11 RX ORDER — POTASSIUM CHLORIDE 1500 MG/1
2 TABLET WITH FOOD TABLET, EXTENDED RELEASE ORAL ONCE A DAY
Status: ACTIVE | COMMUNITY

## 2024-07-11 RX ORDER — PREDNISONE 5 MG/1
1 TABLET TABLET ORAL THREE TIMES A DAY
OUTPATIENT

## 2024-07-11 RX ORDER — INSULIN DEGLUDEC INJECTION 100 U/ML
AS DIRECTED INJECTION, SOLUTION SUBCUTANEOUS
Status: ACTIVE | COMMUNITY

## 2024-07-11 RX ORDER — ALENDRONATE SODIUM 70 MG/1
1 TABLET 30 MINUTES BEFORE THE FIRST FOOD, BEVERAGE OR MEDICINE OF THE DAY WITH PLAIN WATER TABLET ORAL
Status: ACTIVE | COMMUNITY

## 2024-07-11 RX ORDER — INSULIN ASPART 100 [IU]/ML
AS DIRECTED PER SLIDING SCALE INJECTION, SOLUTION INTRAVENOUS; SUBCUTANEOUS
Refills: 0 | Status: ACTIVE | COMMUNITY

## 2024-07-11 RX ORDER — AZATHIOPRINE 50 MG/1
TAKE 1 1/2 TABLET ORALLY ONCE A DAY 90 DAYS TABLET ORAL
Qty: 135 TABLET | Refills: 5 | Status: ACTIVE | COMMUNITY

## 2024-07-11 RX ORDER — SEMAGLUTIDE 0.68 MG/ML
AS DIRECTED INJECTION, SOLUTION SUBCUTANEOUS
Status: ACTIVE | COMMUNITY

## 2024-07-11 RX ORDER — LOSARTAN POTASSIUM 25 MG/1
1 TABLET TABLET ORAL ONCE A DAY
Status: ACTIVE | COMMUNITY

## 2024-07-11 RX ORDER — SPIRONOLACTONE 50 MG/1
1 TABLET TABLET ORAL ONCE A DAY
Qty: 90 TABLET | Status: ACTIVE | COMMUNITY

## 2024-07-11 NOTE — HPI-TODAY'S VISIT:
Toya Anderson is a 64 year old, female, presents for follow up on AIH/cirrhosis as a telehealth visit.  She was diagnosed with AIH in 1985 and MELD  scores have been usually ~ 10- 11. Labs in 10/2023 revealed T bilirubin of 1.5 which is mildly elevated but likely driven by indirect forces and not the liver. LFTs were normal and Alk phos mildly elevated to 152. Repeat labs January 2024 showed a normal H&H (15/43.4) with .8, PLT 71, Albumin 3.1, normal LFT's aside from TBili 2.0, normal renal function. Viral hepatitis panel was negative and did not suggest immunity.   She denies  pruritus, jaundice, fatigue or changes in mentation, abdominal distention, no melena or hematochezia.   She has been on AZA and varying low doses of prednisone since her DX. She is on Prednisone 15 mg now for last few years and multiple attempts to lower the dose has met with increase in transaminases.  Mentions that if medications are decreased she will have a flare.  Her PCP manages DEXA scans and has prescribed Alendronate.  Of note, she is now on Ozempic, but reports only 10 pound weight loss, unchanged from when last seen.   EGD on 6/29/2023 revealed no varices but she had esophageal candida.  CT scan on 6/23 revealed a liver lesion and renal lesion.  Repeat MRI on 7/2023 suggested cirrhotic liver, a simple biliary hamartoma or simple cyst and simple cyst of kidney. Colonoscopy 8/2019 with removal of hyperplastic polyps; 10 year follow up exam advised.

## 2024-08-13 ENCOUNTER — OFFICE VISIT (OUTPATIENT)
Dept: URBAN - METROPOLITAN AREA CLINIC 48 | Facility: CLINIC | Age: 64
End: 2024-08-13
Payer: COMMERCIAL

## 2024-08-13 VITALS
HEIGHT: 65 IN | HEART RATE: 66 BPM | OXYGEN SATURATION: 97 % | DIASTOLIC BLOOD PRESSURE: 81 MMHG | TEMPERATURE: 97.5 F | SYSTOLIC BLOOD PRESSURE: 138 MMHG | WEIGHT: 173.2 LBS | BODY MASS INDEX: 28.86 KG/M2

## 2024-08-13 DIAGNOSIS — K74.69 CIRRHOSIS, CRYPTOGENIC: ICD-10-CM

## 2024-08-13 DIAGNOSIS — K75.4 AUTOIMMUNE HEPATITIS TREATED WITH STEROIDS: ICD-10-CM

## 2024-08-13 PROCEDURE — 99213 OFFICE O/P EST LOW 20 MIN: CPT | Performed by: PHYSICIAN ASSISTANT

## 2024-08-13 RX ORDER — LOSARTAN POTASSIUM 25 MG/1
1 TABLET TABLET ORAL ONCE A DAY
Status: ACTIVE | COMMUNITY

## 2024-08-13 RX ORDER — POTASSIUM CHLORIDE 1500 MG/1
1 TABLET WITH FOOD TABLET, EXTENDED RELEASE ORAL ONCE A DAY
Status: ACTIVE | COMMUNITY

## 2024-08-13 RX ORDER — LEVOTHYROXINE SODIUM 75 UG/1
1 TABLET IN THE MORNING ON AN EMPTY STOMACH TABLET ORAL ONCE A DAY
Status: ACTIVE | COMMUNITY

## 2024-08-13 RX ORDER — AZATHIOPRINE 50 MG/1
TAKE 1 1/2 TABLET ORALLY ONCE A DAY 90 DAYS TABLET ORAL
OUTPATIENT

## 2024-08-13 RX ORDER — PREDNISONE 5 MG/1
1 TABLET TABLET ORAL THREE TIMES A DAY
OUTPATIENT

## 2024-08-13 RX ORDER — INSULIN DEGLUDEC INJECTION 100 U/ML
40 UNITS INJECTION, SOLUTION SUBCUTANEOUS ONCE A DAY
Status: ACTIVE | COMMUNITY

## 2024-08-13 RX ORDER — ALENDRONATE SODIUM 70 MG/1
1 TABLET 30 MINUTES BEFORE THE FIRST FOOD, BEVERAGE OR MEDICINE OF THE DAY WITH PLAIN WATER TABLET ORAL
Status: ACTIVE | COMMUNITY

## 2024-08-13 RX ORDER — AZATHIOPRINE 50 MG/1
TAKE 1 1/2 TABLET ORALLY ONCE A DAY 90 DAYS TABLET ORAL
Status: ACTIVE | COMMUNITY

## 2024-08-13 RX ORDER — PREDNISONE 5 MG/1
1 TABLET TABLET ORAL THREE TIMES A DAY
Status: ACTIVE | COMMUNITY

## 2024-08-13 RX ORDER — SEMAGLUTIDE 0.68 MG/ML
AS DIRECTED INJECTION, SOLUTION SUBCUTANEOUS
Status: ACTIVE | COMMUNITY

## 2024-08-13 RX ORDER — INSULIN ASPART 100 [IU]/ML
15 UNITS INJECTION, SOLUTION INTRAVENOUS; SUBCUTANEOUS THREE TIMES A DAY
Refills: 0 | Status: ACTIVE | COMMUNITY

## 2024-08-13 RX ORDER — SPIRONOLACTONE 50 MG/1
1 TABLET TABLET ORAL ONCE A DAY
Qty: 90 TABLET | Status: ACTIVE | COMMUNITY

## 2024-08-13 NOTE — HPI-TODAY'S VISIT:
Toya Anderson is a 64 year old, female, who was just seen via telehealth for follow up on AIH/cirrhosis a month ago. She was diagnosed with AIH in 1985 and MELD  scores have been usually ~ 10- 11. Labs in 10/2023 revealed T bilirubin of 1.5 which is mildly elevated but likely driven by indirect forces and not the liver. LFTs were normal and Alk phos mildly elevated to 152. Repeat labs January 2024 showed a normal H&H (15/43.4) with .8, PLT 71, Albumin 3.1, normal LFT's aside from TBili 2.0, normal renal function. Viral hepatitis panel was negative and did not suggest immunity.   She denied  pruritus, jaundice, fatigue or changes in mentation, abdominal distention, no melena or hematochezia.   She has been on AZA and varying low doses of prednisone since her DX. She is on Prednisone 15 mg now for last few years and multiple attempts to lower the dose has met with increase in transaminases.  Mentions that if medications are decreased she will have a flare. Her PCP manages DEXA scans and has prescribed Alendronate.  Of note, she is now on Ozempic, but reports only 10 pound weight loss, unchanged from when last seen. She was to make appointment with [facility] hepatology, Dr. Palumbo, and wanted to hold off on any ordering of imaging or labs, and defer to Dr. Palumbo.  8/13/24: She comes in to the office today for unclear reasons. She is not sure how the appointment was made. She was concerned about the diagnosis of esophageal candida from EGD last year, and that she had not been treated. We discussed Fluconazole was prescribed for treatment at her follow up visit (see below). She has no new concerns today.   EGD on 6/29/2023 revealed no varices but she had esophageal candida. When seen in follow up at [facility] 9/2023, she was given a 30 day course of Fluconazole.  CT scan on 6/23 revealed a liver lesion and renal lesion.  Repeat MRI on 7/2023 suggested cirrhotic liver, a simple biliary hamartoma or simple cyst and simple cyst of kidney. Colonoscopy 8/2019 with removal of hyperplastic polyps; 10 year follow up exam advised.

## 2024-08-27 ENCOUNTER — TELEPHONE ENCOUNTER (OUTPATIENT)
Dept: URBAN - METROPOLITAN AREA CLINIC 48 | Facility: CLINIC | Age: 64
End: 2024-08-27

## 2024-09-13 ENCOUNTER — ERX REFILL RESPONSE (OUTPATIENT)
Dept: URBAN - METROPOLITAN AREA CLINIC 46 | Facility: CLINIC | Age: 64
End: 2024-09-13

## 2024-09-13 RX ORDER — PREDNISONE 5 MG/1
TAKE 1 TABLET BY MOUTH THREE TIMES A DAY FOR 90 DAYS TABLET ORAL
Qty: 270 TABLET | Refills: 3 | OUTPATIENT

## 2024-09-13 RX ORDER — PREDNISONE 5 MG/1
TAKE 1 TABLET BY MOUTH THREE TIMES A DAY FOR 90 DAYS TABLET ORAL
Qty: 270 TABLET | Refills: 2 | OUTPATIENT

## 2025-04-07 ENCOUNTER — OFFICE VISIT (OUTPATIENT)
Age: 65
End: 2025-04-07

## 2025-04-07 VITALS
HEART RATE: 72 BPM | RESPIRATION RATE: 20 BRPM | SYSTOLIC BLOOD PRESSURE: 132 MMHG | TEMPERATURE: 98.3 F | WEIGHT: 180 LBS | DIASTOLIC BLOOD PRESSURE: 89 MMHG | OXYGEN SATURATION: 96 %

## 2025-04-07 DIAGNOSIS — R30.0 DYSURIA: ICD-10-CM

## 2025-04-07 DIAGNOSIS — N39.0 URINARY TRACT INFECTION WITHOUT HEMATURIA, SITE UNSPECIFIED: Primary | ICD-10-CM

## 2025-04-07 LAB
BILIRUBIN, URINE, POC: NEGATIVE
BLOOD URINE, POC: NEGATIVE
GLUCOSE URINE, POC: NEGATIVE
KETONES, URINE, POC: NEGATIVE
LEUKOCYTE ESTERASE, URINE, POC: NORMAL
NITRITE, URINE, POC: NEGATIVE
PH, URINE, POC: 6 (ref 4.6–8)
PROTEIN,URINE, POC: NEGATIVE
SPECIFIC GRAVITY, URINE, POC: 1.01 (ref 1–1.03)
URINALYSIS CLARITY, POC: CLEAR
URINALYSIS COLOR, POC: NORMAL
UROBILINOGEN, POC: NORMAL

## 2025-04-07 RX ORDER — MULTIVITAMIN WITH IRON
1 TABLET ORAL DAILY
COMMUNITY

## 2025-04-07 RX ORDER — LOSARTAN POTASSIUM 25 MG/1
25 TABLET ORAL DAILY
COMMUNITY

## 2025-04-07 RX ORDER — VITAMIN B COMPLEX
1 CAPSULE ORAL DAILY
COMMUNITY

## 2025-04-07 RX ORDER — FUROSEMIDE 20 MG/1
20 TABLET ORAL 2 TIMES DAILY
COMMUNITY

## 2025-04-07 RX ORDER — NITROFURANTOIN 25; 75 MG/1; MG/1
100 CAPSULE ORAL 2 TIMES DAILY
Qty: 20 CAPSULE | Refills: 0 | Status: SHIPPED | OUTPATIENT
Start: 2025-04-07 | End: 2025-04-17

## 2025-04-07 RX ORDER — LANOLIN ALCOHOL/MO/W.PET/CERES
400 CREAM (GRAM) TOPICAL DAILY
COMMUNITY

## 2025-04-07 RX ORDER — FERROUS SULFATE 325(65) MG
325 TABLET ORAL
COMMUNITY

## 2025-04-07 RX ORDER — SPIRONOLACTONE 25 MG/1
25 TABLET ORAL DAILY
COMMUNITY

## 2025-04-07 RX ORDER — METOPROLOL SUCCINATE 25 MG/1
25 TABLET, EXTENDED RELEASE ORAL DAILY
COMMUNITY

## 2025-04-07 NOTE — PROGRESS NOTES
Procedures:   Procedures      DIAGNOSTICS:     All lab values have been personally reviewed by myself:   Results for orders placed or performed in visit on 04/07/25   AMB POC URINALYSIS DIP STICK AUTO W/O MICRO   Result Value Ref Range    Color, Urine, POC Light Yellow     Clarity, Urine, POC Clear     Glucose, Urine, POC Negative     Bilirubin, Urine, POC Negative     Ketones, Urine, POC Negative     Specific Gravity, Urine, POC 1.015 1.001 - 1.035    Blood, Urine, POC Negative Negative    pH, Urine, POC 6 4.6 - 8.0    Protein, Urine, POC Negative     Urobilinogen, POC 0.2 mg/dL     Nitrite, Urine, POC Negative     Leukocyte Esterase, Urine, POC 1+         The results of the diagnostic studies have been independently interpreted by myself: All studies will be over-read by Radiologist and patient will be called if any changes to treatment or diagnosis based on final read.         ASSESSMENT/PLAN:    Clinical Impression:   Patient's symptoms are consistent with a urinary tract infection.  Urinalysis shows 1+ leukocytes without hematuria.  Patient reports symptoms are her usual UTI symptoms.  Urine culture ordered and patient prescribed Macrodantin for next 10 days.  She was given discharge and follow-up instructions and told we would call her if any changes in her medication were necessary.  Assessment & Plan  Dysuria       Orders:    AMB POC URINALYSIS DIP STICK AUTO W/O MICRO    Urinary tract infection without hematuria, site unspecified       Orders:    Urine culture (clean catch)           An electronic signature was used to authenticate this note.    ANA WRAY, EUNICE - CNP

## 2025-04-07 NOTE — PATIENT INSTRUCTIONS
Drink plenty of fluids  Tylenol for any discomfort  Take antibiotics until finished.  We sent a urine culture. We will call you if any changes need to be made to the antibiotic.  Follow up with any new or worsening symptoms.

## 2025-04-10 LAB — BACTERIA UR CULT: ABNORMAL

## 2025-06-06 ENCOUNTER — ESTABLISHED COMPREHENSIVE EXAM (OUTPATIENT)
Dept: URBAN - METROPOLITAN AREA CLINIC 34 | Facility: CLINIC | Age: 65
End: 2025-06-06

## 2025-06-06 DIAGNOSIS — H43.393: ICD-10-CM

## 2025-06-06 DIAGNOSIS — H04.123: ICD-10-CM

## 2025-06-06 DIAGNOSIS — H25.13: ICD-10-CM

## 2025-06-06 PROCEDURE — 99214 OFFICE O/P EST MOD 30 MIN: CPT

## 2025-06-06 ASSESSMENT — VISUAL ACUITY
OS_CC: J1
OD_SC: 20/20-1
OS_SC: 20/25-1
OD_CC: J1

## 2025-06-06 ASSESSMENT — TONOMETRY
OD_IOP_MMHG: 26
OS_IOP_MMHG: 25

## 2025-06-23 ENCOUNTER — ERX REFILL RESPONSE (OUTPATIENT)
Dept: URBAN - METROPOLITAN AREA CLINIC 46 | Facility: CLINIC | Age: 65
End: 2025-06-23

## 2025-06-23 RX ORDER — PREDNISONE 5 MG/1
TAKE 1 TABLET BY MOUTH THREE TIMES A DAY FOR 90 DAYS TABLET ORAL
Qty: 270 TABLET | Refills: 2